# Patient Record
Sex: MALE | Race: WHITE
[De-identification: names, ages, dates, MRNs, and addresses within clinical notes are randomized per-mention and may not be internally consistent; named-entity substitution may affect disease eponyms.]

---

## 2020-03-16 ENCOUNTER — HOSPITAL ENCOUNTER (EMERGENCY)
Dept: HOSPITAL 56 - MW.ED | Age: 35
Discharge: HOME | End: 2020-03-16
Payer: COMMERCIAL

## 2020-03-16 DIAGNOSIS — F17.210: ICD-10-CM

## 2020-03-16 DIAGNOSIS — K43.2: Primary | ICD-10-CM

## 2020-03-16 LAB
BUN SERPL-MCNC: 15 MG/DL (ref 7–18)
CHLORIDE SERPL-SCNC: 101 MMOL/L (ref 98–107)
CO2 SERPL-SCNC: 28.4 MMOL/L (ref 21–32)
GLUCOSE SERPL-MCNC: 93 MG/DL (ref 74–106)
LIPASE SERPL-CCNC: 89 U/L (ref 73–393)
POTASSIUM SERPL-SCNC: 4.5 MMOL/L (ref 3.5–5.1)
SODIUM SERPL-SCNC: 138 MMOL/L (ref 136–148)

## 2020-03-16 PROCEDURE — 74177 CT ABD & PELVIS W/CONTRAST: CPT

## 2020-03-16 PROCEDURE — 80053 COMPREHEN METABOLIC PANEL: CPT

## 2020-03-16 PROCEDURE — 96374 THER/PROPH/DIAG INJ IV PUSH: CPT

## 2020-03-16 PROCEDURE — 81003 URINALYSIS AUTO W/O SCOPE: CPT

## 2020-03-16 PROCEDURE — 85025 COMPLETE CBC W/AUTO DIFF WBC: CPT

## 2020-03-16 PROCEDURE — 96361 HYDRATE IV INFUSION ADD-ON: CPT

## 2020-03-16 PROCEDURE — 36415 COLL VENOUS BLD VENIPUNCTURE: CPT

## 2020-03-16 PROCEDURE — 99284 EMERGENCY DEPT VISIT MOD MDM: CPT

## 2020-03-16 PROCEDURE — 83690 ASSAY OF LIPASE: CPT

## 2020-03-16 NOTE — CT
CT abdomen and pelvis

 

Technique: Multiple axial sections were obtained from above the dome 

of the diaphragm inferiorly through the pubic symphysis.  Intravenous 

contrast was utilized.  No oral contrast has been given.

 

Comparison: No prior abdominal imaging is available.

 

Findings: Visualized lung bases show nothing acute.

 

Liver contains no focal parenchymal abnormality.  Spleen appears 

within normal limits.  Adrenal glands show no nodule.  Pancreas is 

within normal limits.  Gallbladder contains no calcified gallstones.  

Aorta shows no aneurysm.  No retroperitoneal adenopathy is seen.  

Kidneys show symmetric contrast enhancement without hydronephrosis or 

mass.  No pelvic mass or adenopathy is seen.  No free fluid or 

inflammatory change is seen.  

 

Small fat-containing inguinal hernias are noted on both sides.  

 

Anterior abdominal wall hernia is seen superior to the umbilicus which

 contains a loop of small bowel.  Proximal small bowel is slightly 

dilated as compared to the more distal small bowel suggesting a mild 

amount of incarceration.  

 

Appendix is seen and is normal in size.

 

Bone window settings were reviewed which shows mild scattered 

degenerative change within the spine.

 

Impression:

1.  Anterior abdominal wall hernia above the umbilicus.  Small bowel 

loop extends into this hernia.  As mentioned above, proximal small 

bowel is slightly dilated as compared to the distal small bowel 

compatible with mild amount of incarceration.

2.  Other findings believed to be incidental.

 

Diagnostic code #5

 

Study was dictated in MDT

## 2020-03-16 NOTE — PCM.CONS
H&P History of Present Illness





- General


Date of Service: 03/16/20


Admit Problem/Dx: 





Abdominal pain. Incarcerated ventral henia.


Source of Information: Patient


History Limitations: Reports: No Limitations





- History of Present Illness


Initial Comments - Free Text/Narative: 





Patient is a 34-year-old gentleman who presents to the emergency room today 

with abdominal pain and a mass just above his umbilicus that is quite tender.  

He denies any nausea, vomiting, fever or chills.  He doesn't recall passing any 

gas or having a bowel movement today.  He states he has known he had a hernia 

for about 5 years.  He is scheduled to have this repaired in 2 weeks at home in 

[place].


Onset of Symptoms: Reports: Today


Duration of Symptoms: Reports: Hour(s):


Location: Reports: Abdomen


Quality: Reports: Pressure, Sharp, Throbbing


Severity: Moderate


Improves with: Reports: Rest


Worsens with: Reports: Movement


Associated Symptoms: Denies: Fever/Chills, Loss of Appetite, Malaise, Nausea/

Vomiting


  ** abd


Pain Score (Numeric/FACES): 8





- Related Data


Allergies/Adverse Reactions: 


 Allergies











Allergy/AdvReac Type Severity Reaction Status Date / Time


 


No Known Allergies Allergy   Verified 03/16/20 14:35











Home Medications: 


 Home Meds





. [No Known Home Meds]  03/16/20 [History]











Past Medical History





- Past Surgical History


HEENT Surgical History: Reports: Tonsillectomy


GI Surgical History: Reports: Hernia, Abdominal





Social & Family History





- Family History


Family Medical History: Noncontributory





- Tobacco Use


Smoking Status *Q: Current Every Day Smoker


Years of Tobacco use: 10


Packs/Tins Daily: 1





- Recreational Drug Use


Recreational Drug Use: No





H&P Review of Systems





- Review of Systems:


Review Of Systems: See Below


General: Denies: Fever, Chills, Malaise, Weakness, Fatigue


HEENT: Reports: No Symptoms


Pulmonary: Denies: Shortness of Breath, Wheezing


Cardiovascular: Denies: Chest Pain


Gastrointestinal: Reports: Abdominal Pain, Decreased Appetite.  Denies: Anorexia

, Black Stool, Bloody Stool, Constipation, Diarrhea, Distension, Flatus, 

Hematemesis, Hematochezia, Melena, Nausea, Vomiting


Genitourinary: Denies: Dysuria, Frequency, Burning, Pain, Urgency


Musculoskeletal: Denies: Neck Pain, Shoulder Pain


Skin: Denies: Cyanosis, Jaundice, Mottled


Psychiatric: Reports: No Symptoms


Neurological: Reports: No Symptoms


Hematologic/Lymphatic: Reports: No Symptoms


Immunologic: Reports: No Symptoms





Exam





- Exam


Exam: See Below





- Vital Signs


Vital Signs: 


 Last Vital Signs











Temp  96.1 F L  03/16/20 14:36


 


Pulse  80   03/16/20 17:06


 


Resp  14   03/16/20 17:06


 


BP  128/84   03/16/20 17:06


 


Pulse Ox  99   03/16/20 17:06











Weight: 240 lb





- Exam


General: Alert, Oriented, Cooperative, Moderate Distress


HEENT: Conjunctiva Clear, EACs Clear, EOMI, Pupils Equal, Pupils Reactive.  No: 

Scleral Icterus


Neck: Supple, Trachea Midline


Lungs: Clear to Auscultation, Normal Respiratory Effort


Cardiovascular: Regular Rate, Regular Rhythm.  No: Tachycardia


GI/Abdominal Exam: Normal Bowel Sounds, Soft, No Distention, Tender, Hernia (

Ventral), Mass (Supraumbilical).  No: Guarding, Rigid, Rebound


 (Male) Exam: Deferred


Rectal (Males) Exam: Deferred


Back Exam: Normal Inspection


Extremities: Normal Inspection, Normal Range of Motion, Non-Tender


Peripheral Pulses: 4+: Posterior Tibial (L), Posterior Tibial (R), Dorsalis 

Pedis (L), Dorsalis Pedis (R)


Skin: Warm, Dry, Intact





- Patient Data


Lab Results Last 24 hrs: 


 Laboratory Results - last 24 hr











  03/16/20 03/16/20 03/16/20 Range/Units





  15:27 15:27 16:40 


 


WBC  8.10    (4.0-11.0)  K/uL


 


RBC  4.95    (4.50-5.90)  M/uL


 


Hgb  15.5    (13.0-17.0)  g/dL


 


Hct  45.6    (38.0-50.0)  %


 


MCV  92.1    (80.0-98.0)  fL


 


MCH  31.3    (27.0-32.0)  pg


 


MCHC  34.0    (31.0-37.0)  g/dL


 


RDW Std Deviation  42.6    (28.0-62.0)  fl


 


RDW Coeff of Cecilia  13    (11.0-15.0)  %


 


Plt Count  234    (150-400)  K/uL


 


MPV  10.60    (7.40-12.00)  fL


 


Neut % (Auto)  71.5    (48.0-80.0)  %


 


Lymph % (Auto)  16.5    (16.0-40.0)  %


 


Mono % (Auto)  6.9    (0.0-15.0)  %


 


Eos % (Auto)  4.7    (0.0-7.0)  %


 


Baso % (Auto)  0.4    (0.0-1.5)  %


 


Neut # (Auto)  5.8 H    (1.4-5.7)  K/uL


 


Lymph # (Auto)  1.3    (0.6-2.4)  K/uL


 


Mono # (Auto)  0.6    (0.0-0.8)  K/uL


 


Eos # (Auto)  0.4    (0.0-0.7)  K/uL


 


Baso # (Auto)  0.0    (0.0-0.1)  K/uL


 


Nucleated RBC %  0.0    /100WBC


 


Nucleated RBCs #  0    K/uL


 


Sodium   138   (136-148)  mmol/L


 


Potassium   4.5   (3.5-5.1)  mmol/L


 


Chloride   101   ()  mmol/L


 


Carbon Dioxide   28.4   (21.0-32.0)  mmol/L


 


BUN   15   (7.0-18.0)  mg/dL


 


Creatinine   1.3   (0.8-1.3)  mg/dL


 


Est Cr Clr Drug Dosing   81.11   mL/min


 


Estimated GFR (MDRD)   > 60.0   ml/min


 


Glucose   93   ()  mg/dL


 


Calcium   9.2   (8.5-10.1)  mg/dL


 


Total Bilirubin   0.5   (0.2-1.0)  mg/dL


 


AST   19   (15-37)  IU/L


 


ALT   28   (14-63)  IU/L


 


Alkaline Phosphatase   71   ()  U/L


 


Total Protein   7.8   (6.4-8.2)  g/dL


 


Albumin   4.4   (3.4-5.0)  g/dL


 


Globulin   3.4   (2.6-4.0)  g/dL


 


Albumin/Globulin Ratio   1.3   (0.9-1.6)  


 


Lipase   89   ()  U/L


 


Urine Color    YELLOW  


 


Urine Appearance    CLEAR  


 


Urine pH    5.5  (5.0-8.0)  


 


Ur Specific Gravity    1.015  (1.001-1.035)  


 


Urine Protein    NEGATIVE  (NEGATIVE)  mg/dL


 


Urine Glucose (UA)    NEGATIVE  (NEGATIVE)  mg/dL


 


Urine Ketones    NEGATIVE  (NEGATIVE)  mg/dL


 


Urine Occult Blood    NEGATIVE  (NEGATIVE)  


 


Urine Nitrite    NEGATIVE  (NEGATIVE)  


 


Urine Bilirubin    NEGATIVE  (NEGATIVE)  


 


Urine Urobilinogen    0.2  (<2.0)  EU/dL


 


Ur Leukocyte Esterase    NEGATIVE  (NEGATIVE)  











Result Diagrams: 


 03/16/20 15:27





 03/16/20 15:27





Sepsis Event Note





- Evaluation


Sepsis Screening Result: No Definite Risk





- Focused Exam


Vital Signs: 


 Vital Signs











  Temp Pulse Resp BP Pulse Ox


 


 03/16/20 17:06   80  14  128/84  99


 


 03/16/20 14:36  96.1 F L  76  15  146/97 H  100











Date Exam was Performed: 03/16/20


Time Exam was Performed: 17:52





Consult PN Assessment/Plan


(1) Incarcerated ventral hernia


SNOMED Code(s): 822381520


   Code(s): K43.6 - OTHER AND UNSP VENTRAL HERNIA WITH OBSTRUCTION, W/O 

GANGRENE   Priority: High   Current Visit: Yes   


Problem List Initiated/Reviewed/Updated: Yes


My Orders Last 24 Hours: 


My Active Orders





03/16/20 17:26


Abdominal Binder [OM.PC] Stat 











Plan: 





CT scan and report have personally been reviewed.  Physical examination is 

consistent with an incarcerated ventral hernia.  With moderate pressure for 2-3 

minutes, I was able to reduce the hernia.  Patient felt markedly improved and 

more comfortable within a minute or 2.  The hernia has stayed reduced during my 

time examining and visiting with him.  I did recommend that he use an abdominal 

binder and return promptly to [place] and see if he can get this repaired 

sooner to prevent another episode of incarceration and possible bowel 

obstruction.

## 2020-03-16 NOTE — EDM.PDOC
ED HPI GENERAL MEDICAL PROBLEM





- General


Chief Complaint: Abdominal Pain


Stated Complaint: STOMACH PAIN


Time Seen by Provider: 03/16/20 15:00


Source of Information: Reports: Patient


History Limitations: Reports: No Limitations





- History of Present Illness


INITIAL COMMENTS - FREE TEXT/NARRATIVE: 





This 34 year old male with a history of a ventral hernia presents to the ED 

with a sudden onset of mid abdominal pain in his hernia area for the past 2 

hours.  He states that he was to have the hernia looked at by a surgeon.  He 

denies any nausea or vomiting or other GI complaints.  He denies any urinary 

complaints.  He denies any chest pain, SOB or difficulty breathing.  He denies 

any other symptoms at this time.  He states that he last ate around 6:00AM. 


Onset: Sudden (2 hours prior to admission to ED)


Duration: Constant


Quality: Reports: Dull, Sharp


Severity: Moderate


Improves with: Reports: Medication (Motrin)


  ** abd


Pain Score (Numeric/FACES): 8





- Related Data


 Allergies











Allergy/AdvReac Type Severity Reaction Status Date / Time


 


No Known Allergies Allergy   Verified 03/16/20 14:35











Home Meds: 


 Home Meds





. [No Known Home Meds]  03/16/20 [History]











Past Medical History





- Past Surgical History


GI Surgical History: Reports: Hernia, Abdominal





Social & Family History





- Family History


Family Medical History: Noncontributory





- Tobacco Use


Smoking Status *Q: Current Every Day Smoker


Years of Tobacco use: 10


Packs/Tins Daily: 1





- Recreational Drug Use


Recreational Drug Use: No





ED ROS GENERAL





- Review of Systems


Review Of Systems: See Below


Constitutional: Reports: No Symptoms


HEENT: Reports: No Symptoms


Respiratory: Reports: No Symptoms


Cardiovascular: Reports: No Symptoms


Endocrine: Reports: No Symptoms


GI/Abdominal: Reports: Abdominal Pain (mid abdomen near ventral hernia site), 

Anorexia.  Denies: Constipation, Diarrhea, Nausea, Vomiting


: Reports: No Symptoms


Musculoskeletal: Reports: No Symptoms


Skin: Reports: No Symptoms


Neurological: Reports: No Symptoms


Psychiatric: Reports: No Symptoms





ED EXAM, GI/ABD





- Physical Exam


Exam: See Below


Exam Limited By: No Limitations


General Appearance: Alert, WD/WN, Moderate Distress (complaining of abdominal 

pain in the mid abdomen)


Ears: Normal External Exam, Normal Canal, Hearing Grossly Normal, Normal TMs


Nose: Normal Inspection, Normal Mucosa, No Blood


Throat/Mouth: Normal Inspection, Normal Lips, Normal Teeth, Normal Gums, Normal 

Oropharynx, Normal Voice, No Airway Compromise


Head: Atraumatic, Normocephalic


Neck: Normal Inspection, Supple, Non-Tender, Full Range of Motion


Respiratory/Chest: No Respiratory Distress, Lungs Clear, Normal Breath Sounds, 

No Accessory Muscle Use, Chest Non-Tender


Cardiovascular: Normal Peripheral Pulses, Regular Rate, Rhythm, No Edema, No 

Gallop, No JVD, No Murmur, No Rub


GI/Abdominal Exam: Normal Bowel Sounds, Soft, No Distention, No Abnormal Bruit, 

Guarding (near hernia site), Hernia (as noted below), Other (tenderness is 

noted over the ventral hernia.  Unable to reduce the hernia which also 

increased the pain.)


 (Male) Exam: Deferred


Rectal (Males) Exam: Deferred


Back Exam: Normal Inspection


Extremities: Normal Inspection, Normal Range of Motion, Normal Capillary Refill


Neurological: Alert, Oriented, CN II-XII Intact, Normal Reflexes


Psychiatric: Normal Affect, Normal Mood


Skin Exam: Warm, Dry, Intact, Normal Color, No Rash


Lymphatic: No Adenopathy





Course





- Vital Signs


Text/Narrative:: 





The patient has an incarcerated ventral hernia.  Dr. Spears the General 

surgeon is in the department examining the patient at 5:20PM.  He will see if 

he can reduce the hernia.





At 5:25PM Dr. Spears was able to reduce the hernia.  He will be discharged.


Last Recorded V/S: 


 Last Vital Signs











Temp  96.1 F L  03/16/20 14:36


 


Pulse  80   03/16/20 17:06


 


Resp  14   03/16/20 17:06


 


BP  128/84   03/16/20 17:06


 


Pulse Ox  99   03/16/20 17:06














- Orders/Labs/Meds


Orders: 


 Active Orders 24 hr











 Category Date Time Status


 


 Notify Provider Consults [RC] ASDIRECTED Care  03/16/20 17:38 Active


 


 Consult to Physician [CONS] Stat Cons  03/16/20 17:38 Active


 


 NPO Now [Nothing per Oral Now Diet] [DIET] Diet  03/16/20 Dinner Active


 


 Abdominal Binder [OM.PC] Stat Oth  03/16/20 17:26 Ordered











Labs: 


 Laboratory Tests











  03/16/20 03/16/20 03/16/20 Range/Units





  15:27 15:27 16:40 


 


WBC  8.10    (4.0-11.0)  K/uL


 


RBC  4.95    (4.50-5.90)  M/uL


 


Hgb  15.5    (13.0-17.0)  g/dL


 


Hct  45.6    (38.0-50.0)  %


 


MCV  92.1    (80.0-98.0)  fL


 


MCH  31.3    (27.0-32.0)  pg


 


MCHC  34.0    (31.0-37.0)  g/dL


 


RDW Std Deviation  42.6    (28.0-62.0)  fl


 


RDW Coeff of Cecilia  13    (11.0-15.0)  %


 


Plt Count  234    (150-400)  K/uL


 


MPV  10.60    (7.40-12.00)  fL


 


Neut % (Auto)  71.5    (48.0-80.0)  %


 


Lymph % (Auto)  16.5    (16.0-40.0)  %


 


Mono % (Auto)  6.9    (0.0-15.0)  %


 


Eos % (Auto)  4.7    (0.0-7.0)  %


 


Baso % (Auto)  0.4    (0.0-1.5)  %


 


Neut # (Auto)  5.8 H    (1.4-5.7)  K/uL


 


Lymph # (Auto)  1.3    (0.6-2.4)  K/uL


 


Mono # (Auto)  0.6    (0.0-0.8)  K/uL


 


Eos # (Auto)  0.4    (0.0-0.7)  K/uL


 


Baso # (Auto)  0.0    (0.0-0.1)  K/uL


 


Nucleated RBC %  0.0    /100WBC


 


Nucleated RBCs #  0    K/uL


 


Sodium   138   (136-148)  mmol/L


 


Potassium   4.5   (3.5-5.1)  mmol/L


 


Chloride   101   ()  mmol/L


 


Carbon Dioxide   28.4   (21.0-32.0)  mmol/L


 


BUN   15   (7.0-18.0)  mg/dL


 


Creatinine   1.3   (0.8-1.3)  mg/dL


 


Est Cr Clr Drug Dosing   81.11   mL/min


 


Estimated GFR (MDRD)   > 60.0   ml/min


 


Glucose   93   ()  mg/dL


 


Calcium   9.2   (8.5-10.1)  mg/dL


 


Total Bilirubin   0.5   (0.2-1.0)  mg/dL


 


AST   19   (15-37)  IU/L


 


ALT   28   (14-63)  IU/L


 


Alkaline Phosphatase   71   ()  U/L


 


Total Protein   7.8   (6.4-8.2)  g/dL


 


Albumin   4.4   (3.4-5.0)  g/dL


 


Globulin   3.4   (2.6-4.0)  g/dL


 


Albumin/Globulin Ratio   1.3   (0.9-1.6)  


 


Lipase   89   ()  U/L


 


Urine Color    YELLOW  


 


Urine Appearance    CLEAR  


 


Urine pH    5.5  (5.0-8.0)  


 


Ur Specific Gravity    1.015  (1.001-1.035)  


 


Urine Protein    NEGATIVE  (NEGATIVE)  mg/dL


 


Urine Glucose (UA)    NEGATIVE  (NEGATIVE)  mg/dL


 


Urine Ketones    NEGATIVE  (NEGATIVE)  mg/dL


 


Urine Occult Blood    NEGATIVE  (NEGATIVE)  


 


Urine Nitrite    NEGATIVE  (NEGATIVE)  


 


Urine Bilirubin    NEGATIVE  (NEGATIVE)  


 


Urine Urobilinogen    0.2  (<2.0)  EU/dL


 


Ur Leukocyte Esterase    NEGATIVE  (NEGATIVE)  











Meds: 


Medications














Discontinued Medications














Generic Name Dose Route Start Last Admin





  Trade Name Freq  PRN Reason Stop Dose Admin


 


Sodium Chloride  1,000 mls @ 1,000 mls/hr  03/16/20 15:16  03/16/20 15:25





  Normal Saline  IV  03/16/20 16:15  1,000 mls/hr





  .Bolus ONE   Administration





     





     





     





     


 


Iopamidol  100 ml  03/16/20 16:42  03/16/20 16:43





  Isovue Multipack-370 (76%)  IVPUSH  03/16/20 16:43  100 ml





  ONETIME STA   Administration





     





     





     





     


 


Ketorolac Tromethamine  30 mg  03/16/20 17:26  03/16/20 17:30





  Toradol  IVPUSH  03/16/20 17:27  30 mg





  ONETIME ONE   Administration





     





     





     





     














Departure





- Departure


Time of Disposition: 18:19


Disposition: Home, Self-Care 01


Condition: Good


Clinical Impression: 


 Ventral hernia, recurrent








- Discharge Information


*PRESCRIPTION DRUG MONITORING PROGRAM REVIEWED*: Yes


*COPY OF PRESCRIPTION DRUG MONITORING REPORT IN PATIENT BUTCH: Yes


Instructions:  Hernia, Adult, Easy-to-Read


Referrals: 


PCP,None [Primary Care Provider] - 


Forms:  ED Department Discharge


Additional Instructions: 


Follow up with surgeon as you discussed with Dr. Spears.  Drink plenty of 

clear liquids for the next 24 hours.  Wear your hernia belt as directed by Dr. Spears  Rest for the next 24 hours.  Return to the ED if your condition gets 

worse or should you have any questions or concerns.





The following information is given to patients seen in the emergency department 

who are being discharged to home. This information is to outline your options 

for follow-up care. We provide all patients seen in our emergency department 

with a follow-up referral.





The need for follow-up, as well as the timing and circumstances, are variable 

depending upon the specifics of your emergency department visit.





If you don't have a primary care physician on staff, we will provide you with a 

referral. We always advise you to contact your personal physician following an 

emergency department visit to inform them of the circumstance of the visit and 

for follow-up with them and/or the need for any referrals to a consulting 

specialist.





The emergency department will also refer you to a specialist when appropriate. 

This referral assures that you have the opportunity for follow-up care with a 

specialist. All of these measure are taken in an effort to provide you with 

optimal care, which includes your follow-up.





Under all circumstances we always encourage you to contact your private 

physician who remains a resource for coordinating your care. When calling for 

follow-up care, please make the office aware that this follow-up is from your 

recent emergency room visit. If for any reason you are refused follow-up, 

please contact the [facility] Emergency 

Department at [phone] and asked to speak to the emergency department 

charge nurse.





Sepsis Event Note





- Evaluation


Sepsis Screening Result: No Definite Risk





- Focused Exam


Vital Signs: 


 Vital Signs











  Temp Pulse Resp BP Pulse Ox


 


 03/16/20 17:06   80  14  128/84  99


 


 03/16/20 14:36  96.1 F L  76  15  146/97 H  100











Date Exam was Performed: 03/16/20


Time Exam was Performed: 18:24





- My Orders


Last 24 Hours: 


My Active Orders





03/16/20 17:38


Notify Provider Consults [RC] ASDIRECTED 


Consult to Physician [CONS] Stat 





03/16/20 Dinner


NPO Now [Nothing per Oral Now Diet] [DIET] 














- Assessment/Plan


Last 24 Hours: 


My Active Orders





03/16/20 17:38


Notify Provider Consults [RC] ASDIRECTED 


Consult to Physician [CONS] Stat 





03/16/20 Dinner


NPO Now [Nothing per Oral Now Diet] [DIET]